# Patient Record
(demographics unavailable — no encounter records)

---

## 2025-05-13 NOTE — HISTORY OF PRESENT ILLNESS
[] : yes [de-identified] : Known to me for B/L TKA 2017 by Dr. Whitehead no issues.  Had right TONY done by another surgeon in the city due to insurance issues - initially did well but after 3 months started having pain again and never resolved.  Lateral and groin pain.  No fevers/chills.  No workup done yet.  Pain is daily. [FreeTextEntry5] : Right hip pain for couple months, No injury. Had RT vazquez by [place]  06/25/24. Having soreness near insesion.

## 2025-05-13 NOTE — ASSESSMENT
[FreeTextEntry1] : Right TONY 1 year postop still with pain - recc ESR and CRP as well as MRI right hip eval for fluid collection and abductor injury.

## 2025-05-13 NOTE — DISCUSSION/SUMMARY
[de-identified] : The patient was advised of the diagnosis.  The natural history of the pathology was explained in full to the patient in layman's terms. All questions were answered.  The risks and benefits of surgical and non-surgical treatment alternatives were explained in full to the patient.  I saw the patient under the supervision of Dr. Whitehead and followed his plan of care.

## 2025-05-13 NOTE — IMAGING
[Right] : right hip with pelvis [AP] : anteroposterior [Lateral] : lateral [Components well fixed, in good position] : Components well fixed, in good position [de-identified] : Right hip: Inc healed.  No swelling.  Troch tenderness.  No pain with hip rotation.  NVI.  Walks without assistance.